# Patient Record
Sex: MALE | Race: WHITE | NOT HISPANIC OR LATINO | URBAN - METROPOLITAN AREA
[De-identification: names, ages, dates, MRNs, and addresses within clinical notes are randomized per-mention and may not be internally consistent; named-entity substitution may affect disease eponyms.]

---

## 2018-01-18 ENCOUNTER — APPOINTMENT (RX ONLY)
Dept: URBAN - METROPOLITAN AREA CLINIC 143 | Facility: CLINIC | Age: 78
Setting detail: DERMATOLOGY
End: 2018-01-18

## 2018-01-18 DIAGNOSIS — L85.3 XEROSIS CUTIS: ICD-10-CM

## 2018-01-18 DIAGNOSIS — L82.1 OTHER SEBORRHEIC KERATOSIS: ICD-10-CM

## 2018-01-18 DIAGNOSIS — L98.0 PYOGENIC GRANULOMA: ICD-10-CM

## 2018-01-18 PROBLEM — M12.9 ARTHROPATHY, UNSPECIFIED: Status: ACTIVE | Noted: 2018-01-18

## 2018-01-18 PROBLEM — I25.10 ATHEROSCLEROTIC HEART DISEASE OF NATIVE CORONARY ARTERY WITHOUT ANGINA PECTORIS: Status: ACTIVE | Noted: 2018-01-18

## 2018-01-18 PROBLEM — D48.5 NEOPLASM OF UNCERTAIN BEHAVIOR OF SKIN: Status: ACTIVE | Noted: 2018-01-18

## 2018-01-18 PROBLEM — E13.9 OTHER SPECIFIED DIABETES MELLITUS WITHOUT COMPLICATIONS: Status: ACTIVE | Noted: 2018-01-18

## 2018-01-18 PROBLEM — J44.9 CHRONIC OBSTRUCTIVE PULMONARY DISEASE, UNSPECIFIED: Status: ACTIVE | Noted: 2018-01-18

## 2018-01-18 PROBLEM — I10 ESSENTIAL (PRIMARY) HYPERTENSION: Status: ACTIVE | Noted: 2018-01-18

## 2018-01-18 PROCEDURE — ? TREATMENT REGIMEN

## 2018-01-18 PROCEDURE — 99202 OFFICE O/P NEW SF 15 MIN: CPT | Mod: 25

## 2018-01-18 PROCEDURE — 11100: CPT

## 2018-01-18 PROCEDURE — ? BIOPSY BY SHAVE METHOD

## 2018-01-18 PROCEDURE — ? COUNSELING

## 2018-01-18 ASSESSMENT — LOCATION DETAILED DESCRIPTION DERM
LOCATION DETAILED: LEFT DISTAL DORSAL FOREARM
LOCATION DETAILED: RIGHT PROXIMAL RADIAL DORSAL FOREARM
LOCATION DETAILED: RIGHT PROXIMAL DORSAL FOREARM
LOCATION DETAILED: RIGHT DISTAL DORSAL FOREARM

## 2018-01-18 ASSESSMENT — LOCATION SIMPLE DESCRIPTION DERM
LOCATION SIMPLE: RIGHT FOREARM
LOCATION SIMPLE: LEFT FOREARM

## 2018-01-18 ASSESSMENT — LOCATION ZONE DERM: LOCATION ZONE: ARM

## 2018-01-18 NOTE — PROCEDURE: BIOPSY BY SHAVE METHOD
Detail Level: Detailed
Bill For Surgical Tray: no
Billing Type: Third-Party Bill
Biopsy Method: Dermablade
Hemostasis: Drysol
Curettage Text: The wound bed was treated with curettage after the biopsy was performed.
Electrodesiccation And Curettage Text: The wound bed was treated with electrodesiccation and curettage after the biopsy was performed.
Biopsy Type: H and E
Additional Anesthesia Volume In Cc (Will Not Render If 0): 0
Wound Care: Petrolatum
Type Of Destruction Used: Curettage
Silver Nitrate Text: The wound bed was treated with silver nitrate after the biopsy was performed.
Notification Instructions: Patient will be notified of biopsy results. However, patient instructed to call the office if not contacted within 2 weeks.
Dressing: bandage
Anesthesia Type: 1% lidocaine without epinephrine
Post-Care Instructions: I reviewed with the patient in detail post-care instructions. Patient is to keep the biopsy site dry overnight, and then apply bacitracin twice daily until healed. Patient may apply hydrogen peroxide soaks to remove any crusting.
Consent: Written consent was obtained and risks were reviewed including but not limited to scarring, infection, bleeding, scabbing, incomplete removal, nerve damage and allergy to anesthesia.
Electrodesiccation Text: The wound bed was treated with electrodesiccation after the biopsy was performed.
Cryotherapy Text: The wound bed was treated with cryotherapy after the biopsy was performed.
Anesthesia Volume In Cc: 0.5
Size Of Lesion In Cm: 1.5

## 2018-01-18 NOTE — HPI: WOUND
Is The Wound New Or Recurrent?: new
How Severe Is It?: moderate
How Is Your Wound Healing?: not healing
Is This A New Presentation, Or A Follow-Up?: Wound
Type Of Injury: Scrape

## 2018-01-18 NOTE — PROCEDURE: MIPS QUALITY
Quality 131: Pain Assessment And Follow-Up: Pain assessment using a standardized tool is documented as negative, no follow-up plan required
Quality 154 Part A: Falls: Risk Assessment (Should Be Reported With Measure 155.): Falls risk assessment completed and documented in the past 12 months.
Quality 431: Preventive Care And Screening: Unhealthy Alcohol Use - Screening: Patient screened for unhealthy alcohol use using a single question and scores less than 2 times per year
Quality 110: Preventive Care And Screening: Influenza Immunization: Influenza immunization was not ordered or administered, reason not given
Quality 155 (Denominator): Falls Plan Of Care: Plan of Care not Documented, Reason not Otherwise Specified
Quality 154 Part B: Falls: Risk Screening (Should Be Reported With Measure 155.): Patient screened for future fall risk; documentation of no falls in the past year or only one fall without injury in the past year
Quality 111:Pneumonia Vaccination Status For Older Adults: Pneumococcal Vaccination Previously Received
Quality 226: Preventive Care And Screening: Tobacco Use: Screening And Cessation Intervention: Patient screened for tobacco and is an ex-smoker
Quality 47: Advance Care Plan: Advance Care Planning discussed and documented in the medical record; patient did not wish or was not able to name a surrogate decision maker or provide an advance care plan.
Detail Level: Detailed

## 2018-01-31 ENCOUNTER — APPOINTMENT (RX ONLY)
Dept: URBAN - METROPOLITAN AREA CLINIC 143 | Facility: CLINIC | Age: 78
Setting detail: DERMATOLOGY
End: 2018-01-31

## 2018-01-31 PROBLEM — H91.90 UNSPECIFIED HEARING LOSS, UNSPECIFIED EAR: Status: ACTIVE | Noted: 2018-01-31

## 2018-01-31 PROBLEM — J45.909 UNSPECIFIED ASTHMA, UNCOMPLICATED: Status: ACTIVE | Noted: 2018-01-31

## 2018-01-31 PROBLEM — C44.692 OTHER SPECIFIED MALIGNANT NEOPLASM OF SKIN OF RIGHT UPPER LIMB, INCLUDING SHOULDER: Status: ACTIVE | Noted: 2018-01-31

## 2018-01-31 PROBLEM — L55.1 SUNBURN OF SECOND DEGREE: Status: ACTIVE | Noted: 2018-01-31

## 2018-01-31 PROCEDURE — ? COUNSELING

## 2018-01-31 PROCEDURE — ? OTHER

## 2018-01-31 PROCEDURE — ? DEFER

## 2018-01-31 PROCEDURE — 99212 OFFICE O/P EST SF 10 MIN: CPT

## 2018-01-31 NOTE — PROCEDURE: MIPS QUALITY
Quality 110: Preventive Care And Screening: Influenza Immunization: Influenza Immunization Ordered or Recommended, but not Administered due to system reason
Detail Level: Detailed
Quality 155 (Denominator): Falls Plan Of Care: Plan of Care not Documented, Reason not Otherwise Specified
Quality 431: Preventive Care And Screening: Unhealthy Alcohol Use - Screening: Patient screened for unhealthy alcohol use using a single question and scores less than 2 times per year
Quality 111:Pneumonia Vaccination Status For Older Adults: Pneumococcal Vaccination Administered
Quality 154 Part A: Falls: Risk Assessment (Should Be Reported With Measure 155.): Falls risk assessment completed and documented in the past 12 months.
Quality 131: Pain Assessment And Follow-Up: Pain assessment using a standardized tool is documented as negative, no follow-up plan required
Quality 226: Preventive Care And Screening: Tobacco Use: Screening And Cessation Intervention: Patient screened for tobacco and is an ex-smoker
Quality 154 Part B: Falls: Risk Screening (Should Be Reported With Measure 155.): Patient screened for future fall risk; documentation of no falls in the past year or only one fall without injury in the past year
Quality 47: Advance Care Plan: Advance Care Planning discussed and documented; advance care plan or surrogate decision maker documented in the medical record.

## 2018-01-31 NOTE — PROCEDURE: OTHER
Note Text (......Xxx Chief Complaint.): This diagnosis correlates with the
Detail Level: Simple
Other (Free Text): Pathology and necessity for tx explained to pt.\\nRecommend wide excision in the office or with general surgery and also consult with oncology. Pt provided with contact information for a general surgeon and an oncologist.\\nPt is happy with current results s/p bx because lesion is no longer bleeding, but explained at length the necessity for further tx d/t possibility of recurrence and metastasis.\\nPt did not commit to any tx plan today and would like to talk over the results with his wife.\\nInformed pt I will call him Monday to discuss his decision if he does not call the office sooner.

## 2021-10-14 ENCOUNTER — OFFICE VISIT (OUTPATIENT)
Dept: URBAN - METROPOLITAN AREA CLINIC 56 | Facility: CLINIC | Age: 81
End: 2021-10-14
Payer: COMMERCIAL

## 2021-10-14 DIAGNOSIS — H44.511 ABSOLUTE GLAUCOMA OF RIGHT EYE: ICD-10-CM

## 2021-10-14 PROCEDURE — 76514 ECHO EXAM OF EYE THICKNESS: CPT | Performed by: OPHTHALMOLOGY

## 2021-10-14 PROCEDURE — 99204 OFFICE O/P NEW MOD 45 MIN: CPT | Performed by: OPHTHALMOLOGY

## 2021-10-14 PROCEDURE — 92020 GONIOSCOPY: CPT | Performed by: OPHTHALMOLOGY

## 2021-10-14 PROCEDURE — 92133 CPTRZD OPH DX IMG PST SGM ON: CPT | Performed by: OPHTHALMOLOGY

## 2021-10-14 PROCEDURE — 92083 EXTENDED VISUAL FIELD XM: CPT | Performed by: OPHTHALMOLOGY

## 2021-10-14 RX ORDER — BRINZOLAMIDE/BRIMONIDINE TARTRATE 10; 2 MG/ML; MG/ML
SUSPENSION/ DROPS OPHTHALMIC
Qty: 30 | Refills: 1 | Status: INACTIVE
Start: 2021-10-14 | End: 2022-02-21

## 2021-10-14 RX ORDER — LATANOPROST 50 UG/ML
0.005 % SOLUTION OPHTHALMIC
Qty: 7.5 | Refills: 1 | Status: INACTIVE
Start: 2021-10-14 | End: 2022-02-21

## 2021-10-14 ASSESSMENT — VISUAL ACUITY
OD: NLP
OS: 20/20

## 2021-10-14 ASSESSMENT — INTRAOCULAR PRESSURE
OS: 19
OD: 26

## 2021-10-14 ASSESSMENT — KERATOMETRY
OD: 47.27
OS: 47.29

## 2021-10-14 NOTE — IMPRESSION/PLAN
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 Plan: NLP OD. Patient stated OD is not painful at this time.

## 2021-10-14 NOTE — IMPRESSION/PLAN
Impression: Primary open-angle glaucoma, left eye, severe stage: A67.9437. Plan: IOP may be too high today, but patient has drop confusion. Patient to STOP Vyzulta, CONTINUE Latanoprost QHS OU, and Simbrinza BID OU. Will continue to monitor IOP and testing.  
RTC in 2 months for VF 10-2 OS only and DFE (tech to check IOP and dilate) Disc Photos

## 2021-12-21 ENCOUNTER — OFFICE VISIT (OUTPATIENT)
Dept: URBAN - METROPOLITAN AREA CLINIC 56 | Facility: CLINIC | Age: 81
End: 2021-12-21
Payer: COMMERCIAL

## 2021-12-21 PROCEDURE — 99212 OFFICE O/P EST SF 10 MIN: CPT | Performed by: OPHTHALMOLOGY

## 2021-12-21 ASSESSMENT — INTRAOCULAR PRESSURE
OS: 21
OD: 30

## 2021-12-21 NOTE — IMPRESSION/PLAN
Impression: Primary open-angle glaucoma, left eye, severe stage: N11.0818. Plan: IOP is a little higher today. Recommend lowering IOP further. Patient to START Rhopressa QHS OS, CONTINUE Latanoprost QHS OU, and Simbrinza BID OU. Will continue to monitor IOP and testing. RTC in 2 months for IOP check - ok in general clinic.  If not improved to mid teens rec FU with glaucoma surgeon

## 2022-02-21 ENCOUNTER — OFFICE VISIT (OUTPATIENT)
Dept: URBAN - METROPOLITAN AREA CLINIC 10 | Facility: CLINIC | Age: 82
End: 2022-02-21
Payer: COMMERCIAL

## 2022-02-21 DIAGNOSIS — H40.1123 PRIMARY OPEN-ANGLE GLAUCOMA, LEFT EYE, SEVERE STAGE: Primary | ICD-10-CM

## 2022-02-21 DIAGNOSIS — H25.12 AGE-RELATED NUCLEAR CATARACT, LEFT EYE: ICD-10-CM

## 2022-02-21 PROCEDURE — 92020 GONIOSCOPY: CPT | Performed by: OPHTHALMOLOGY

## 2022-02-21 PROCEDURE — 99214 OFFICE O/P EST MOD 30 MIN: CPT | Performed by: OPHTHALMOLOGY

## 2022-02-21 RX ORDER — NETARSUDIL 0.2 MG/ML
0.02 % SOLUTION/ DROPS OPHTHALMIC; TOPICAL
Qty: 30 | Refills: 3 | Status: INACTIVE
Start: 2022-02-21 | End: 2022-04-11

## 2022-02-21 RX ORDER — BRINZOLAMIDE/BRIMONIDINE TARTRATE 10; 2 MG/ML; MG/ML
SUSPENSION/ DROPS OPHTHALMIC
Qty: 30 | Refills: 1 | Status: ACTIVE
Start: 2022-02-21

## 2022-02-21 RX ORDER — LATANOPROSTENE BUNOD 0.24 MG/ML
0.024 % SOLUTION/ DROPS OPHTHALMIC
Qty: 30 | Refills: 3 | Status: ACTIVE
Start: 2022-02-21

## 2022-02-21 RX ORDER — NETARSUDIL 0.2 MG/ML
0.02 % SOLUTION/ DROPS OPHTHALMIC; TOPICAL
Qty: 0 | Refills: 0 | Status: INACTIVE
Start: 2022-02-21 | End: 2022-02-21

## 2022-02-21 RX ORDER — LATANOPROSTENE BUNOD 0.24 MG/ML
0.024 % SOLUTION/ DROPS OPHTHALMIC
Qty: 0 | Refills: 0 | Status: INACTIVE
Start: 2022-02-21 | End: 2022-02-21

## 2022-02-21 ASSESSMENT — INTRAOCULAR PRESSURE
OD: 37
OS: 17

## 2022-02-21 ASSESSMENT — VISUAL ACUITY
OS: 20/20
OD: NLP

## 2022-02-21 NOTE — IMPRESSION/PLAN
Impression: Primary open-angle glaucoma, left eye, severe stage: A96.6042. Plan: Pt has Glaucoma    Gonio : ss 2-3+ ou       Pachs:  522/480    Today's IOP :  37/17       Tmax  :  37/21 Target IOP low teens or less ou Pt denies Fhx of Glaucoma Right / Left eye is the better seeing eye HVF
C/D:  
OCT: 0/50 02/21/22 Pt denies Sulfa Allergy // Pt denies Lung /Heart dx Plan :
1. Continue (pt was not using Rhopressa - ran out of sample) Simbrinza ou bid Vyzulta ou qhs Rhopressa ou qhs
2. Discussed details about Glaucoma and that without proper control of pressures irreversible blindness can occur. Patient understands risks. Emphasize compliance with drop and without compliance vision loss progression can occur. 3. f/u iop check in 4-6 weeks with Dr. Britta Perez for change in medication 4.  If IOP not in low teens then plan for glc surgery (pt prefers [facility])

## 2022-04-04 ENCOUNTER — OFFICE VISIT (OUTPATIENT)
Dept: URBAN - METROPOLITAN AREA CLINIC 56 | Facility: CLINIC | Age: 82
End: 2022-04-04
Payer: COMMERCIAL

## 2022-04-04 PROCEDURE — 99204 OFFICE O/P NEW MOD 45 MIN: CPT | Performed by: STUDENT IN AN ORGANIZED HEALTH CARE EDUCATION/TRAINING PROGRAM

## 2022-04-04 ASSESSMENT — INTRAOCULAR PRESSURE
OD: 35
OS: 24

## 2022-04-04 NOTE — IMPRESSION/PLAN
Impression: Primary open-angle glaucoma, left eye, severe stage: D73.1420. (-) Fam Hx **Tmax  :  37/21 **Target IOP low teens or less ou
*Pachs:  522/480 *Gonio : ss 2-3+ ou. Plan: Good compliance with gtts, but IOP elevated OS. Will scheduled with Dr. Jovany Chavez next available for surgical consult. Continue Simbrinza BID OU Continue Vyzulta QHS OU Continue Rhopressa QHS OU

## 2022-06-23 ENCOUNTER — OFFICE VISIT (OUTPATIENT)
Dept: URBAN - METROPOLITAN AREA CLINIC 56 | Facility: CLINIC | Age: 82
End: 2022-06-23
Payer: MEDICARE

## 2022-06-23 DIAGNOSIS — H44.511 ABSOLUTE GLAUCOMA OF RIGHT EYE: ICD-10-CM

## 2022-06-23 DIAGNOSIS — H40.1123 PRIMARY OPEN-ANGLE GLAUCOMA, LEFT EYE, SEVERE STAGE: Primary | ICD-10-CM

## 2022-06-23 PROCEDURE — 99214 OFFICE O/P EST MOD 30 MIN: CPT | Performed by: OPHTHALMOLOGY

## 2022-06-23 RX ORDER — KETOROLAC TROMETHAMINE 5 MG/ML
0.5 % SOLUTION OPHTHALMIC
Qty: 10 | Refills: 1 | Status: ACTIVE
Start: 2022-06-23

## 2022-06-23 RX ORDER — LATANOPROST 50 UG/ML
0.005 % SOLUTION OPHTHALMIC
Qty: 7.5 | Refills: 1 | Status: ACTIVE
Start: 2022-06-23

## 2022-06-23 RX ORDER — NETARSUDIL 0.2 MG/ML
0.02 % SOLUTION/ DROPS OPHTHALMIC; TOPICAL
Qty: 2.5 | Refills: 5 | Status: ACTIVE
Start: 2022-06-23

## 2022-06-23 RX ORDER — BRINZOLAMIDE/BRIMONIDINE TARTRATE 10; 2 MG/ML; MG/ML
SUSPENSION/ DROPS OPHTHALMIC
Qty: 30 | Refills: 1 | Status: ACTIVE
Start: 2022-06-23

## 2022-06-23 ASSESSMENT — INTRAOCULAR PRESSURE
OS: 21
OD: 28

## 2022-06-23 NOTE — IMPRESSION/PLAN
Impression: Primary open-angle glaucoma, left eye, severe stage: W75.0217. Plan: IOP is unchanged. Patient is not using drops correctly. Recommend lowering IOP OS further. Discussed with the patient the findings of today's exam. IOP OS is too high for the level of glaucomatous damage at the present time. Discussed the need for SLT vs additional medicine to try and achieve better pressure control. Selective Laser Trabeculoplasty risks, benefits, alternatives to laser discussed with patient, including vision loss, inflammation, or elevation of IOP. Discussed it may take anywhere from 6wks to 3mos to see the full effect of the laser. Patient understands that SLT is not a replacement for current drop therapy. All questions answered. Patient understands and desires to proceed. Patient to use drops as follows; Latanoprost QHS OU, Simbrinza BID OU, and Rhopressa QHS OU (ok if prn od). Drop instruction sheet given, and explained to patient in detail. Emphasized compliance; patient to use drops as directed, EVEN on days of appointments. Poor compliance can lead to blindness. Educational material provided. Erx'd post op drop of Ketorolac [id number].

## 2022-08-01 ENCOUNTER — SURGERY (OUTPATIENT)
Dept: URBAN - METROPOLITAN AREA SURGERY 19 | Facility: SURGERY | Age: 82
End: 2022-08-01
Payer: MEDICARE

## 2022-08-01 PROCEDURE — 65855 TRABECULOPLASTY LASER SURG: CPT | Performed by: OPHTHALMOLOGY

## 2022-09-13 ENCOUNTER — OFFICE VISIT (OUTPATIENT)
Dept: URBAN - METROPOLITAN AREA CLINIC 56 | Facility: CLINIC | Age: 82
End: 2022-09-13
Payer: MEDICARE

## 2022-09-13 DIAGNOSIS — H40.1123 PRIMARY OPEN-ANGLE GLAUCOMA, LEFT EYE, SEVERE STAGE: Primary | ICD-10-CM

## 2022-09-13 DIAGNOSIS — H44.511 ABSOLUTE GLAUCOMA OF RIGHT EYE: ICD-10-CM

## 2022-09-13 PROCEDURE — 92083 EXTENDED VISUAL FIELD XM: CPT | Performed by: OPHTHALMOLOGY

## 2022-09-13 PROCEDURE — 99213 OFFICE O/P EST LOW 20 MIN: CPT | Performed by: OPHTHALMOLOGY

## 2022-09-13 RX ORDER — LATANOPROST 50 UG/ML
0.005 % SOLUTION OPHTHALMIC
Qty: 7.5 | Refills: 1 | Status: ACTIVE
Start: 2022-09-13

## 2022-09-13 RX ORDER — NETARSUDIL 0.2 MG/ML
0.02 % SOLUTION/ DROPS OPHTHALMIC; TOPICAL
Qty: 2.5 | Refills: 5 | Status: ACTIVE
Start: 2022-09-13

## 2022-09-13 RX ORDER — BRINZOLAMIDE/BRIMONIDINE TARTRATE 10; 2 MG/ML; MG/ML
SUSPENSION/ DROPS OPHTHALMIC
Qty: 30 | Refills: 1 | Status: ACTIVE
Start: 2022-09-13

## 2022-09-13 ASSESSMENT — INTRAOCULAR PRESSURE
OS: 17
OD: 33

## 2022-09-13 NOTE — IMPRESSION/PLAN
Impression: Primary open-angle glaucoma, left eye, severe stage: J22.4153. Plan: IOP improved after SLT OS. VF OS is worse, but that is in comparison to VF before SLT, so follow closely. IOP is high OD (NLP, not painful), and lower OS since SLT OS. Patient to CONTINUE Latanoprost QHS OU, Simbrinza BID OU, and Rhopressa QHS OU (ok if prn od). Patient to use drops as directed, EVEN on mornings of appointments. Poor compliance can lead to blindness. Reminded patient of monocular precautions with patient. Advised patient to wear protective eye wear. Will continue to monitor IOP and testing.

## 2022-11-15 ENCOUNTER — OFFICE VISIT (OUTPATIENT)
Facility: LOCATION | Age: 82
End: 2022-11-15
Payer: MEDICARE

## 2022-11-15 DIAGNOSIS — H40.1123 PRIMARY OPEN-ANGLE GLAUCOMA, LEFT EYE, SEVERE STAGE: Primary | ICD-10-CM

## 2022-11-15 DIAGNOSIS — H44.511 ABSOLUTE GLAUCOMA OF RIGHT EYE: ICD-10-CM

## 2022-11-15 PROCEDURE — 99214 OFFICE O/P EST MOD 30 MIN: CPT | Performed by: STUDENT IN AN ORGANIZED HEALTH CARE EDUCATION/TRAINING PROGRAM

## 2022-11-15 PROCEDURE — 92083 EXTENDED VISUAL FIELD XM: CPT | Performed by: STUDENT IN AN ORGANIZED HEALTH CARE EDUCATION/TRAINING PROGRAM

## 2022-11-15 PROCEDURE — 92020 GONIOSCOPY: CPT | Performed by: STUDENT IN AN ORGANIZED HEALTH CARE EDUCATION/TRAINING PROGRAM

## 2022-11-15 RX ORDER — NETARSUDIL 0.2 MG/ML
0.02 % SOLUTION/ DROPS OPHTHALMIC; TOPICAL
Qty: 2.5 | Refills: 5 | Status: ACTIVE
Start: 2022-11-15

## 2022-11-15 RX ORDER — LATANOPROST 50 UG/ML
0.005 % SOLUTION OPHTHALMIC
Qty: 7.5 | Refills: 1 | Status: ACTIVE
Start: 2022-11-15

## 2022-11-15 RX ORDER — BRINZOLAMIDE/BRIMONIDINE TARTRATE 10; 2 MG/ML; MG/ML
SUSPENSION/ DROPS OPHTHALMIC
Qty: 30 | Refills: 1 | Status: ACTIVE
Start: 2022-11-15

## 2022-11-15 RX ORDER — OFLOXACIN 3 MG/ML
0.3 % SOLUTION/ DROPS OPHTHALMIC
Qty: 5 | Refills: 0 | Status: ACTIVE
Start: 2022-11-15

## 2022-11-15 RX ORDER — DUREZOL 0.5 MG/ML
0.05 % EMULSION OPHTHALMIC
Qty: 10 | Refills: 2 | Status: ACTIVE
Start: 2022-11-15

## 2022-11-15 RX ORDER — PREDNISOLONE ACETATE 10 MG/ML
1 % SUSPENSION/ DROPS OPHTHALMIC
Qty: 10 | Refills: 3 | Status: INACTIVE
Start: 2022-11-15 | End: 2022-11-15

## 2022-11-15 ASSESSMENT — VISUAL ACUITY: OS: 20/20

## 2022-11-15 ASSESSMENT — INTRAOCULAR PRESSURE
OS: 18
OD: 43
OD: 44
OS: 20

## 2022-11-15 NOTE — IMPRESSION/PLAN
Impression: Primary open-angle glaucoma, left eye, severe stage: D26.8562.
1 Plan: Ocular Surgical History: s/p Pachy: Delaney Au Tmax: 44/24 Target IOP: low teens OS Med Allergies: none Heart / Lung / Kidney disease/sulfa allergy: Pt. denies Gonioscopy: grade 4, 2+ TMP OU
OCT: OS: 50 diffuse thinning HVF: 24-2 OS: MD- 16 [place] / 10-2 OS: MD - 5 sup arc - worse C/D: .99/.9 Better seeing eye: OS
IOP Today: 43/18 Plan: IOP today is stable today. VF is worse, IOP is too high for the level of glaucomatous damage at this time. Recommend lowering IOP. Discussed Xen OS- Patient would like to proceed with Joanna Elaine. Drops: CONTINUE Latanoprost QHS OU, Simbrinza BID OU, and Rhopressa QHS OU Discussed natural history of glaucoma and that irreversible vision loss can occur without adequate IOP control. Emphasized compliance with eyedrops and other treatment described above.

## 2022-11-15 NOTE — IMPRESSION/PLAN
Impression: Primary open-angle glaucoma, left eye, severe stage: A73.7160.
2 Plan: Xen gel stent is recommended. Glaucoma diagnosis and progressive vision loss from elevated IOP discussed at length. R/B/A of surgery discussed. The Patient is aware that the risks of Xen include but are not limited to loss of vision and loss of the eye, bleeding, infection, inflammation, Hypotony, wound leak, Persistent IOP elevation, intractable diplopia, bleb failure, scarring, and orbital or ocular inflammation. This surgery is an attempt to preserve vision, vision improvement is not expected. The patient is aware that failure to lower IOP will likely lead to progressive sight loss and possibly blindness. Pt. understand and wishes to proceed. Please notify OR to order device (Xen gel stent) and Mitomycin 4% (allow 1 week for mitomycin to be ordered). No injectable/Dexycu, use drops.